# Patient Record
Sex: MALE | Race: WHITE
[De-identification: names, ages, dates, MRNs, and addresses within clinical notes are randomized per-mention and may not be internally consistent; named-entity substitution may affect disease eponyms.]

---

## 2017-10-09 ENCOUNTER — HOSPITAL ENCOUNTER (EMERGENCY)
Dept: HOSPITAL 62 - ER | Age: 57
Discharge: HOME | End: 2017-10-09
Payer: OTHER GOVERNMENT

## 2017-10-09 VITALS — DIASTOLIC BLOOD PRESSURE: 75 MMHG | SYSTOLIC BLOOD PRESSURE: 137 MMHG

## 2017-10-09 DIAGNOSIS — R19.7: ICD-10-CM

## 2017-10-09 DIAGNOSIS — Z88.5: ICD-10-CM

## 2017-10-09 DIAGNOSIS — Z88.8: ICD-10-CM

## 2017-10-09 DIAGNOSIS — E86.0: ICD-10-CM

## 2017-10-09 DIAGNOSIS — R10.31: Primary | ICD-10-CM

## 2017-10-09 DIAGNOSIS — I10: ICD-10-CM

## 2017-10-09 DIAGNOSIS — E11.9: ICD-10-CM

## 2017-10-09 DIAGNOSIS — N50.811: ICD-10-CM

## 2017-10-09 LAB
ALBUMIN SERPL-MCNC: 4.1 G/DL (ref 3.5–5)
ALP SERPL-CCNC: 88 U/L (ref 38–126)
ALT SERPL-CCNC: 45 U/L (ref 21–72)
ANION GAP SERPL CALC-SCNC: 12 MMOL/L (ref 5–19)
APPEARANCE UR: CLEAR
AST SERPL-CCNC: 24 U/L (ref 17–59)
BASOPHILS # BLD AUTO: 0.1 10^3/UL (ref 0–0.2)
BASOPHILS NFR BLD AUTO: 0.9 % (ref 0–2)
BILIRUB DIRECT SERPL-MCNC: 0.4 MG/DL (ref 0–0.4)
BILIRUB SERPL-MCNC: 0.7 MG/DL (ref 0.2–1.3)
BILIRUB UR QL STRIP: NEGATIVE
BUN SERPL-MCNC: 20 MG/DL (ref 7–20)
CALCIUM: 9.7 MG/DL (ref 8.4–10.2)
CHLORIDE SERPL-SCNC: 105 MMOL/L (ref 98–107)
CO2 SERPL-SCNC: 25 MMOL/L (ref 22–30)
CREAT SERPL-MCNC: 1.07 MG/DL (ref 0.52–1.25)
EOSINOPHIL # BLD AUTO: 0.3 10^3/UL (ref 0–0.6)
EOSINOPHIL NFR BLD AUTO: 3.6 % (ref 0–6)
ERYTHROCYTE [DISTWIDTH] IN BLOOD BY AUTOMATED COUNT: 13.8 % (ref 11.5–14)
GLUCOSE SERPL-MCNC: 294 MG/DL (ref 75–110)
GLUCOSE UR STRIP-MCNC: >=500 MG/DL
HCT VFR BLD CALC: 44.4 % (ref 37.9–51)
HGB BLD-MCNC: 15 G/DL (ref 13.5–17)
HGB HCT DIFFERENCE: 0.6
KETONES UR STRIP-MCNC: NEGATIVE MG/DL
LIPASE SERPL-CCNC: 114.5 U/L (ref 23–300)
LYMPHOCYTES # BLD AUTO: 2.4 10^3/UL (ref 0.5–4.7)
LYMPHOCYTES NFR BLD AUTO: 32.2 % (ref 13–45)
MCH RBC QN AUTO: 29.6 PG (ref 27–33.4)
MCHC RBC AUTO-ENTMCNC: 33.7 G/DL (ref 32–36)
MCV RBC AUTO: 88 FL (ref 80–97)
MONOCYTES # BLD AUTO: 0.8 10^3/UL (ref 0.1–1.4)
MONOCYTES NFR BLD AUTO: 10.4 % (ref 3–13)
NEUTROPHILS # BLD AUTO: 3.9 10^3/UL (ref 1.7–8.2)
NEUTS SEG NFR BLD AUTO: 52.9 % (ref 42–78)
NITRITE UR QL STRIP: NEGATIVE
PH UR STRIP: 5 [PH] (ref 5–9)
POTASSIUM SERPL-SCNC: 5.5 MMOL/L (ref 3.6–5)
PROT SERPL-MCNC: 6.6 G/DL (ref 6.3–8.2)
PROT UR STRIP-MCNC: NEGATIVE MG/DL
RBC # BLD AUTO: 5.05 10^6/UL (ref 4.35–5.55)
SODIUM SERPL-SCNC: 142.3 MMOL/L (ref 137–145)
SP GR UR STRIP: 1.02
UROBILINOGEN UR-MCNC: NEGATIVE MG/DL (ref ?–2)
WBC # BLD AUTO: 7.4 10^3/UL (ref 4–10.5)

## 2017-10-09 PROCEDURE — 36415 COLL VENOUS BLD VENIPUNCTURE: CPT

## 2017-10-09 PROCEDURE — 96360 HYDRATION IV INFUSION INIT: CPT

## 2017-10-09 PROCEDURE — 80053 COMPREHEN METABOLIC PANEL: CPT

## 2017-10-09 PROCEDURE — 76380 CAT SCAN FOLLOW-UP STUDY: CPT

## 2017-10-09 PROCEDURE — 99284 EMERGENCY DEPT VISIT MOD MDM: CPT

## 2017-10-09 PROCEDURE — 81001 URINALYSIS AUTO W/SCOPE: CPT

## 2017-10-09 PROCEDURE — 83690 ASSAY OF LIPASE: CPT

## 2017-10-09 PROCEDURE — 96361 HYDRATE IV INFUSION ADD-ON: CPT

## 2017-10-09 PROCEDURE — 85025 COMPLETE CBC W/AUTO DIFF WBC: CPT

## 2017-10-09 NOTE — ER DOCUMENT REPORT
ED General





- General


Chief Complaint: Flank Pain


Stated Complaint: FLANK PAIN


Time Seen by Provider: 10/09/17 11:11


TRAVEL OUTSIDE OF THE [place] IN LAST 30 DAYS: No





- HPI


Patient complains to provider of: Right flank pain right lower quadrant pain 

right testicle pain


Notes: 





Patient coming in for evaluation of right flank right lower quadrant and right 

testicle pain patient states pain is mostly worse at night.  Patient states has 

been having diarrhea denies any nausea vomiting denies any fevers or chills.  

Patient states pain has been ongoing for approximately last 3-4 days.  Denies 

any trauma patient denies any penile discharge.  Patient currently is resting 

patient states he had a colonoscopy approximately 1 year ago with no 

adventitious findings.  Denies any recent antibiotic use.





- Related Data


Allergies/Adverse Reactions: 


 





codeine Allergy (Verified 09/15/13 04:55)


 


hydrochlorothiazide [Hydrochlorothiazide] Allergy (Verified 09/15/13 04:55)


 











Past Medical History





- Social History


Smoking Status: Never Smoker


Chew tobacco use (# tins/day): No


Frequency of alcohol use: None


Drug Abuse: None


Family History: Reviewed & Not Pertinent


Patient has suicidal ideation: No


Patient has homicidal ideation: No





- Past Medical History


Cardiac Medical History: Reports: Hx Hypercholesterolemia, Hx Hypertension


   Denies: Hx Coronary Artery Disease, Hx Heart Attack


Pulmonary Medical History: 


   Denies: Hx Asthma, Hx Bronchitis, Hx COPD, Hx Pneumonia


Neurological Medical History: Reports: Hx Migraine.  Denies: Hx Cerebrovascular 

Accident, Hx Seizures


Endocrine Medical History: Reports: Hx Diabetes Mellitus Type 2


Renal/ Medical History: Denies: Hx Peritoneal Dialysis


GI Medical History: Reports: Hx Gastroesophageal Reflux Disease


Musculoskeltal Medical History: Reports Hx Arthritis - GENERALIZED


Past Surgical History: Reports: Hx Orthopedic Surgery - SPINAL FUSION, neck 

surgery





- Immunizations


Hx Diphtheria, Pertussis, Tetanus Vaccination: Yes





Review of Systems





- Review of Systems


Constitutional: No symptoms reported


EENT: No symptoms reported


Cardiovascular: No symptoms reported


Respiratory: No symptoms reported


Gastrointestinal: No symptoms reported


Genitourinary: Flank pain


Male Genitourinary: No symptoms reported


Musculoskeletal: No symptoms reported


Skin: No symptoms reported


Hematologic/Lymphatic: No symptoms reported


Neurological/Psychological: No symptoms reported


-: Yes All other systems reviewed and negative





Physical Exam





- Vital signs


Vitals: 


 











Temp Pulse Resp BP Pulse Ox


 


 97.6 F   56 L  16   127/75 H  96 


 


 10/09/17 10:54  10/09/17 10:54  10/09/17 10:54  10/09/17 10:54  10/09/17 10:54











Interpretation: Normal





- General


General appearance: Appears well, Alert





- HEENT


Head: Normocephalic, Atraumatic


Eyes: Normal


Pupils: PERRL





- Respiratory


Respiratory status: No respiratory distress


Chest status: Nontender


Breath sounds: Normal


Chest palpation: Normal





- Cardiovascular


Rhythm: Regular


Heart sounds: Normal auscultation


Murmur: No





- Abdominal


Inspection: Normal


Distension: No distension


Bowel sounds: Normal


Tenderness: Nontender


Organomegaly: No organomegaly





- Genitourinary


Inspection: Normal


Tenderness: Nontender


Cremasteric reflex: Normal


Scrotum: Normal


Notes: 





No pain to palpation of the epididymis bilaterally





- Back


Back: Normal, Nontender





- Extremities


General upper extremity: Normal inspection, Nontender, Normal color, Normal ROM

, Normal temperature


General lower extremity: Normal inspection, Nontender, Normal color, Normal ROM

, Normal temperature, Normal weight bearing.  No: Radha's sign





- Neurological


Neuro grossly intact: Yes


Cognition: Normal


Orientation: AAOx4


[place] Coma Scale Eye Opening: Spontaneous


[place] Coma Scale Verbal: Oriented


[place] Coma Scale Motor: Obeys Commands


[place] Coma Scale Total: 15


Speech: Normal


Motor strength normal: LUE, RUE, LLE, RLE


Sensory: Normal





- Psychological


Associated symptoms: Normal affect, Normal mood





- Skin


Skin Temperature: Warm


Skin Moisture: Dry


Skin Color: Normal





Course





- Re-evaluation


Re-evalutation: 





10/09/17 15:33


Patient's potassium slightly elevated positive from dehydration patient was 

given IV fluids here.  No need for any other treatment at this time.  CT scan 

of the abdomen did not show any pathology consistent with the patient's pain.  

Explained to patient I would treat him for possible colonic spasms is that he 

is having diarrhea there is no signs of any infectious etiology no signs of 

acute appendicitis The patient presents with abdominal pain without signs of 

peritonitis or other life-threatening or serious etiology. The patient appears 

stable for discharge and has been instructed to return immediately if the 

symptoms worsen in any way, or in 8-12hr if not improved for re-evaluation. The 

patient has been instructed to return if the symptoms worsen or change in any 

way.





- Vital Signs


Vital signs: 


 











Temp Pulse Resp BP Pulse Ox


 


 97.8 F   61   20   137/75 H  100 


 


 10/09/17 14:26  10/09/17 14:26  10/09/17 14:26  10/09/17 14:26  10/09/17 14:26














- Laboratory


Result Diagrams: 


 10/09/17 11:29





 10/09/17 11:29


Laboratory results interpreted by me: 


 











  10/09/17 10/09/17





  11:29 12:04


 


Potassium  5.5 H 


 


Glucose  294 H 


 


Urine Glucose (UA)   >=500 H


 


Urine Ascorbic Acid   20 H














Discharge





- Discharge


Clinical Impression: 


Abdominal pain


Qualifiers:


 Abdominal location: right lower quadrant Qualified Code(s): R10.31 - Right 

lower quadrant pain





Condition: Good


Disposition: HOME, SELF-CARE


Instructions:  Abdominal Pain ([facility]), Diarrhea, Nonspecific ([facility]), Observation 

for Appendicitis ([facility])


Additional Instructions: 


Take medication as prescribed.  Return to the ER if symptoms worsen.  Follow-up 

with your primary care physician.


Prescriptions: 


Dicyclomine HCl [Bentyl 20 mg Tablet] 20 mg PO QID #20 tablet


Forms:  Return to Work


Referrals: 


ANANYA CUELLAR NP [Primary Care Provider] - Follow up as needed

## 2017-10-09 NOTE — RADIOLOGY REPORT (SQ)
EXAM DESCRIPTION:  CT LTD RENAL STONE PROTOCOL ON



COMPLETED DATE/TIME:  10/9/2017 12:10 pm



REASON FOR STUDY:  flank rlq testicle pain



COMPARISON:  None.



TECHNIQUE:  CT scan of the abdomen and pelvis performed without intravenous or oral contrast. Images 
reviewed with lung, soft tissue, and bone windows. Reconstructed coronal and sagittal MPR images revi
ewed. All images stored on PACS.

All CT scanners at this facility use dose modulation, iterative reconstruction, and/or weight based d
osing when appropriate to reduce radiation dose to as low as reasonably achievable (ALARA).

[facility]: Dose Right  [facility]: CareDose    [facility]: Dose Right    [facility]: Teradose 4D    [facility]: Smart OneWed (Formerly Nearlyweds)



RADIATION DOSE:  Up-to-date CT equipment and radiation dose reduction techniques were employed. CTDIv
ol: 8.4 mGy. DLP: 529 mGy-cm.mGy.



LIMITATIONS:  None.



FINDINGS:  LOWER CHEST: No significant findings. No nodules or infiltrates.

NON-CONTRASTED LIVER, SPLEEN, ADRENALS: Evaluation limited by lack of IV contrast. No identified sign
ificant masses.

PANCREAS: No masses. No peripancreatic inflammatory changes.

GALLBLADDER: No identified stones by CT criteria. No inflammatory changes to suggest cholecystitis.

RIGHT KIDNEY AND URETER: No suspicious masses. Assessment limited by lack of IV contrast.   No signif
icant calcifications.   No hydronephrosis or hydroureter.

LEFT KIDNEY AND URETER: No suspicious masses. Assessment limited by lack of IV contrast.   There is a
 tiny nonobstructing intrarenal calculus on image 41 series 3.   No hydronephrosis or hydroureter.

AORTA AND RETROPERITONEUM: No aneurysm. No retroperitoneal masses or adenopathy.

BOWEL AND PERITONEAL CAVITY: No obvious masses or inflammatory changes. No free fluid.

APPENDIX: Normal.

PELVIS, BLADDER, AND ABDOMINAL WALL:There is questionable thickening of the bladder wall.  The bladde
r is not distended well.

BONES: No significant findings.

OTHER: No other significant finding.



IMPRESSION:  1.  There is a small nonobstructing left renal calculus with no ureteral stone or obstru
ction on either side.

2.  There is questionable thickening versus nondistention of the urinary bladder.  Correlate for cyst
itis.



COMMENT:  Quality ID # [id number]: Final reports with documentation of one or more dose reduction techniques
 (e.g., Automated exposure control, adjustment of the mA and/or kV according to patient size, use of 
iterative reconstruction technique)



TECHNICAL DOCUMENTATION:  JOB ID:  [id number]

 2011 [employer]- All Rights Reserved

## 2019-03-27 ENCOUNTER — HOSPITAL ENCOUNTER (EMERGENCY)
Dept: HOSPITAL 62 - ER | Age: 59
Discharge: HOME | End: 2019-03-27
Payer: MEDICARE

## 2019-03-27 VITALS — SYSTOLIC BLOOD PRESSURE: 119 MMHG | DIASTOLIC BLOOD PRESSURE: 73 MMHG

## 2019-03-27 DIAGNOSIS — I10: ICD-10-CM

## 2019-03-27 DIAGNOSIS — M54.5: ICD-10-CM

## 2019-03-27 DIAGNOSIS — M54.10: ICD-10-CM

## 2019-03-27 DIAGNOSIS — G89.29: Primary | ICD-10-CM

## 2019-03-27 DIAGNOSIS — E11.9: ICD-10-CM

## 2019-03-27 DIAGNOSIS — Z98.1: ICD-10-CM

## 2019-03-27 PROCEDURE — 99283 EMERGENCY DEPT VISIT LOW MDM: CPT

## 2019-03-27 PROCEDURE — 96372 THER/PROPH/DIAG INJ SC/IM: CPT

## 2019-03-27 NOTE — ER DOCUMENT REPORT
HPI





- HPI


Time Seen by Provider: 03/27/19 19:30


Pain Level: 2


Notes: 





Patient is a 58-year-old male with a history of hypertension, GERD, diabetes who

presents to the ED complaining of acute on chronic low back pain left greater 

than right over the last 2-3 days.  Patient is unaware of anything that flared 

up his back this time around, but it has been flared up in the past.  Patient 

states that bending twisting of the trunk make his pain worse.  Patient states 

that he will have pain that radiates down into his left leg.  He is eating and 

drinking without any difficulties.  He is urinating normally and having normal 

bowel movements.  He has not had any injections or procedures to his lower back.

 No history of spinal abscess.  Denies any IV drug abuse.  No other concerns or 

complaints.  Denies any headache, fever, neck pain, URI, sore throat, chest 

pain, palpitations, syncope, cough, shortness of breath, wheeze, dyspnea, 

abdominal pain, nausea/vomiting/diarrhea, urinary retention, dysuria, hematuria,

loss of control of bowel or bladder, numbness/tingling, saddle anesthesia, 

muscle paralysis/weakness, or rash.





He has an appointment tomorrow with Pain Management.





- ROS


Systems Reviewed and Negative: Yes All other systems reviewed and negative





Past Medical History





- Social History


Smoking Status: Never Smoker


Family History: Reviewed & Not Pertinent





- Past Medical History


Cardiac Medical History: Reports: Hx Hypercholesterolemia, Hx Hypertension


   Denies: Hx Coronary Artery Disease, Hx Heart Attack


Pulmonary Medical History: 


   Denies: Hx Asthma, Hx Bronchitis, Hx COPD, Hx Pneumonia


Neurological Medical History: Reports: Hx Migraine.  Denies: Hx Cerebrovascular 

Accident, Hx Seizures


Endocrine Medical History: Reports: Hx Diabetes Mellitus Type 2


Renal/ Medical History: Denies: Hx Peritoneal Dialysis


GI Medical History: Reports: Hx Gastroesophageal Reflux Disease


Musculoskeletal Medical History: Reports Hx Arthritis - GENERALIZED


Past Surgical History: Reports: Hx Orthopedic Surgery - SPINAL FUSION, neck 

surgery





- Immunizations


Hx Diphtheria, Pertussis, Tetanus Vaccination: Yes





Vertical Provider Document





- CONSTITUTIONAL


Agree With Documented VS: Yes


Notes: 





PHYSICAL EXAMINATION:





GENERAL: Well-appearing, well-nourished and in no acute distress. 





LUNGS: Breath sounds clear to auscultation bilaterally and equal.  No wheezes 

rales or rhonchi.





HEART: Regular rate and rhythm without murmurs, rubs, gallops.





ABDOMEN: Soft, nontender, nondistended abdomen.  No guarding, no rebound.  No 

masses appreciated.  Normal bowel sounds present.  No CVA tenderness jesu

aterally.  No pulsatile mass





Musculoskeletal: LE's b/l:  FROM to passive/active. Strength 5+/5.  No deficits 

noted.  No bony tenderness of extremities.





Back:  FROM to passive/active.  Strength 5+/5.  No vertebral point tenderness, 

stepoffs, or deformities.  No other bony tenderness, erythema, swelling, or 

ecchymosis.  SLR negative b/l.  + mild tenderness to the L-paraspinal mm L>R.  

Mild spasming.  No SI jt tenderness.  No foot drop





Extremities:  No cyanosis, clubbing, or edema b/l.  Peripheral pulses 2+.  

Capillary refill less than 2 seconds.





NEUROLOGICAL: Normal speech, normal/slight limp (favors left side) gait.  Normal

sensory, motor exams.  Reflexes 2+ b/l.  





PSYCH: Normal mood, normal affect.





SKIN: Warm, Dry, normal turgor, no rashes or lesions noted.





- INFECTION CONTROL


TRAVEL OUTSIDE OF THE [place] IN LAST 30 DAYS: No





Course





- Re-evaluation


Re-evalutation: 





03/27/19 19:40


Patient is an afebrile, well-hydrated, 58-year-old male who presents to the ED 

with acute on chronic low back pain.  Vitals are acceptable.  PE is otherwise 

unremarkable for any focal neurological deficits.  Pt was given medicine for his

pain and a lidoderm patch.  Pt has not been to this ED much at all for his 

chronic back pain.  He has no significant tachycardia, tachypnea, or hypoxia.  

He is nontoxic-appearing and is tolerating p.o. without difficulties.  There are

no signs of infection.  No other red flag symptoms noted.  No other labs or 

imaging warranted at this time based on H&P.  Low suspicion for any meningitis, 

fracture, expanding/ruptured AAA, cauda equina syndrome, epidural mass 

lesion/abscess, herniated disc causing severe spinal stenosis, or other systemic

infection at this time.  Patient is aware that his condition can change from 

initial presentation and that he needs monitor symptoms closely for any acute 

changes.  I will send him home with a prescription for naproxen.  Conservative 

measures otherwise for symptoms.  Recheck with your PCM in 3-5 days. He has an 

appointment tomorrow with Pain Management.  Consider consult with 

orthopedic/physical therapy.  Return to the ED with any worsening/concerning 

symptoms otherwise as reviewed discharge.  Patient is in agreement.





- Vital Signs


Vital signs: 


                                        











Temp Pulse Resp BP Pulse Ox


 


 98.3 F   67   18   112/66   100 


 


 03/27/19 18:47  03/27/19 18:47  03/27/19 18:47  03/27/19 18:47  03/27/19 18:47














Discharge





- Discharge


Clinical Impression: 


 Acute exacerbation of chronic low back pain, Radiculitis





Condition: Stable


Disposition: HOME, SELF-CARE


Instructions:  Low Back Pain ([facility])


Additional Instructions: 


Rest, Ice


Tylenol/ibuprofen as needed


Light stretches daily


Strength exercises as able


Moist heat and massage may help


F/u with your PCP in 3-5 days for a recheck


Keep appointment tomorrow with pain management


Consider consult(s) with Orthopedics/physical therapy for ongoing/worsening 

symptoms





Return to the ED with any worsening symptoms and/or development of fever, 

headache, chest pain, palpitations, syncope, shortness of breath, trouble 

breathing, abdominal pain, n/v/d, blood in stool/urine, loss of control of 

bowel/bladder, urinary retention, muscle weakness/paralysis, saddle anesthesia, 

numbness/tingling, or other worsening symptoms that are concerning to you.


Prescriptions: 


Lidocaine [Lidoderm 5% (700 mg) Transdermal Patch] 1 patch TP DAILY #10 

adh..patch


Naproxen 500 mg PO BID #10 tablet


Referrals: 


ANANYA CUELLAR APRN [Primary Care Provider] - Follow up as needed


[facility] [Provider Group] - Follow up as needed

## 2019-06-27 ENCOUNTER — HOSPITAL ENCOUNTER (OUTPATIENT)
Dept: HOSPITAL 62 - RAD | Age: 59
End: 2019-06-27
Attending: INTERNAL MEDICINE
Payer: MEDICARE

## 2019-06-27 DIAGNOSIS — R13.12: Primary | ICD-10-CM

## 2019-06-27 PROCEDURE — 74230 X-RAY XM SWLNG FUNCJ C+: CPT

## 2019-06-27 NOTE — ST MODIFIED BARIUM SWALLOW
Recommendation





- Recommendations


Recommendations: Recommend follow up with physician. No deficits in oral or 

pharyngeal function. Some retrograde movement of bolus through cricopharyngeus.





Medical Diagnoses





- Medical Diagnoses


Medical Diagnosis Description & ICD-10 Code(s): R13.12


Other Medical Diagnoses/Co-Morbidities: per patient report: reflux, cervical 

spinal fusion in 2005





- ICD-10 Tx Diagnosis Coding


(1) Dysphagia


ICD-10 Code(s): R13.10 - DYSPHAGIA, UNSPECIFIED   





ST Modified Barium Swallow





- General


Date: 06/27/19


Referring Physician: Dr. Longoria


Date of Onset: 06/01/16 - approximate onset date


Reason for Referral: difficulty swallowing





- History


History obtained from: Patient


-: Medical - per patient report: Mr. Mccartney reports having swallowing 

difficulty "for a long time, it's getting worse now". Patient reports that 

specificially liquids "go down the wrong pipe", reports coughing with these 

instances. Patient does have reflux and is on medications for this. He does 

state that if he does not take his medications, he is coughing at night and will

have vocal hoarseness the next day. No diet changes reported. He does also 

report a recent scope showing an ulcer in his stomach.


Medications: per patient report: reflux medication (unable to name), aspirin, 

insulin, metformin, lipitor, propanole, jardiance, macardis, fish oil, vitamin 

D, B12


Allergies: per patient report: codeine, hydrochlorothiazide





- Functional Status


Prior Functional Status: INDEPENDENT: feeding


Current Functional Limitations: feeding





- Subjective


Patient/caregiver goal(s): better swallow, r/o aspiration


Cognitive-Linguistic Function: WNL


Speech Intelligibility: WNL


Current Nutritional Means: PO


Current PO diet: Regular


Current symptoms: Coughing


Pain: Patient reports, 0/5





- Objective


Assessment: Upright, Left Lateral, A-P Position





- Food Trials Used


Food trials used: Thin liquids, Pureed, Regular





- Oral-Motor Skills


Dentition: Full


Velo-pharyngeal function: Unremarkable


Laryngeal Function: clear voicing





- Assessment


Oral prep: Normal


Labial closure: Adequate


Leakage: None


Mastication: Adequate


Lingual Movement: Normal


Oral stage: Normal for this Procedure





- Pharyngeal Stage


Initiation of Pharyngeal Stage Reflex: Normal


Decreased laryngeal elevation: No


Reduced Velopharyngeal Closure: no


Reduced pressure generation: No


reduced tongue-based retraction: No


Pre-swallow pooling in valleculae: None


Pre-Swallow pooling in pyriforms: None


Reduced Thyro-Hyoid approximation: No


Reduced epiglottic excursion: No


Reduced pharyngeal peristalsis/contraction: No


Multiple Swallows with: Cleared w/ Liquid Assist


Post-swallow residulas vallecular: Mild


Post-Swallow residuals in pyriforms: Moderate - with regular solids


Reduced Cricopharyngeal opening: Yes - some retrograde movement of solids seen 

through the cricopharyngeus.





- Fall Risk Assessment


Medications/Conditions that increase fall risks include: Antidepressants, 

sedatives, anti-arrhythmic, diuretic, benzodiazipenes, neuroleptics.  BP 

regulation problems, cardiac problems, balance or gait deficits, neurological 

problems.


Is patient considered at risk for falls: no


Fall Risk Actions Taken: No action needed





- Behavioral Observations


During evaluation process patient: was pleasant, was cooperative, able to answer

questions, provided medical history





- Treatment / Educational Needs:


Treatment/Education Needs: Treatment consisted of patient education on the role 

of the Speech Pathologist.  Patient's plan of care and golas were communicated 

as well as scheduling and attendance policies.  Recommendations for initial home

program were shared.  Patient demonstrated understanding and verbalized 

agreement.





- Impression/Summary


Laryngeal Penetration: Yes, Flash, during swallow - on 1 swallow of thin liquids


Tracheal Aspiration: no


Patient presents with: Esophageal stage dysph.


Risk of Aspiration: Minimal


Evaluation and Findings: Retrograde movement of solids seen through UES, 

resulting in residue in pyriform sinus. This was not seen on liquid trials, 

however, based on patient's symptoms, retrograde movement of liquids may be 

causing patient to cough when drinking. All other pharyngeal musculature moves 

appropriately, and airway is sufficiently protected during the swallow. The 

patient was seen to cough x1 with thin liquid trial, no penetration or 

aspiration seen on this event. At a later time, the patient did penetrate with 

thin liquid, but no cough was seen and the liquid easily redirected out of 

laryngeal vestibule.





- Recommendations


Solid diet recommendations: Regular


Liquid Diet Modification: Thin


Dysphagia therapy with SLP: no


Reflux Precautions: Taught to Patient


Recommended techniques: Fully Upright During Meal, Small Bites and Sips, Alter

ted Bites/Sips


Supervision: Independent


Information, Precautions and Recommendations: Patient (Written), Patient 

(Verbal)





- Time


Total Time: 30





- Plan of Care


Strategies to optimize patient understanding include:: ongoing assessment of 

educational needs, implementation of educational strategies, and re-education.





- -


-: Thank you for the opportunity to work with this patient and his/her family.  

Should you have any questions about this patient's plan or progress, I can be 

reached at [phone].

## 2019-06-27 NOTE — RADIOLOGY REPORT (SQ)
EXAM DESCRIPTION:  ZARI SWALLOW



COMPLETED DATE/TIME:  6/27/2019 8:42 am



REASON FOR STUDY:  DYSPHAGIA (R13.12) R13.12  DYSPHAGIA, OROPHARYNGEAL PHASE



COMPARISON:  None.



TECHNIQUE:  Videofluoroscopic swallowing examination was performed in conjunction with speech patholo
gy.  Videofluoroscopic imaging was obtained and reviewed and these are the findings:



RADIATION DOSE:  1 minutes 2 seconds of fluoroscopy was used.

2 images saved to PACS.



LIMITATIONS:  None



FINDINGS:  The patient was brought into the fluoro room and placed upright on a modified barium swall
ow chair.  The patient was then given multiple consistencies mixed with barium to swallow under live 
fluoroscopic video guidance.  According to the Speech Pathologist there was trace laryngeal penetrati
on with thin liquids.  No aspiration seen.  Post swallow residual contrast seen within the piriform s
inuses.



IMPRESSION:  LARYNGEAL PENETRATION WITHOUT ASPIRATION. PLEASE SEE SPEECH PATHOLOGIST REPORT FOR OTHER
 FINDINGS AND RECOMMENDATIONS.



COMMENT:  Quality :  Final reports for procedures using fluoroscopy that document radiation exp
osure indices, or exposure time and number of fluorographic images (if radiation exposure indices are
 not available)



TECHNICAL DOCUMENTATION:  JOB ID: [id number]

 2011 [employer]- All Rights Reserved



Reading location - IP/workstation name: [id number]